# Patient Record
Sex: MALE | URBAN - METROPOLITAN AREA
[De-identification: names, ages, dates, MRNs, and addresses within clinical notes are randomized per-mention and may not be internally consistent; named-entity substitution may affect disease eponyms.]

---

## 2024-07-25 ENCOUNTER — OFFICE VISIT (OUTPATIENT)
Dept: URBAN - METROPOLITAN AREA CLINIC 19 | Facility: CLINIC | Age: 79
End: 2024-07-25

## 2024-07-25 NOTE — HPI-TODAY'S VISIT:
7/25/2024:  Selvin: 77 yo M presents for hospital f/u. Sx: Alleviated by: Aggravated by: GI meds: PCP or other prior workup. SH: FH: